# Patient Record
Sex: FEMALE
[De-identification: names, ages, dates, MRNs, and addresses within clinical notes are randomized per-mention and may not be internally consistent; named-entity substitution may affect disease eponyms.]

---

## 2022-07-12 LAB
HCT VFR BLD CALC: 30.9 % (ref 30.3–42.9)
HGB BLD-MCNC: 10.3 GM/DL (ref 10.1–14.3)
MCHC RBC AUTO-ENTMCNC: 33 % (ref 30–34)
MCV RBC AUTO: 88 FL (ref 79–97)
PLATELET # BLD: 250 K/MM3 (ref 140–440)
RBC # BLD AUTO: 3.51 M/MM3 (ref 3.65–5.03)

## 2022-07-14 ENCOUNTER — HOSPITAL ENCOUNTER (INPATIENT)
Dept: HOSPITAL 5 - APU | Age: 32
LOS: 2 days | Discharge: HOME | End: 2022-07-16
Attending: STUDENT IN AN ORGANIZED HEALTH CARE EDUCATION/TRAINING PROGRAM | Admitting: STUDENT IN AN ORGANIZED HEALTH CARE EDUCATION/TRAINING PROGRAM
Payer: COMMERCIAL

## 2022-07-14 DIAGNOSIS — O34.211: Primary | ICD-10-CM

## 2022-07-14 DIAGNOSIS — Z3A.39: ICD-10-CM

## 2022-07-14 DIAGNOSIS — Z20.822: ICD-10-CM

## 2022-07-14 DIAGNOSIS — Z30.2: ICD-10-CM

## 2022-07-14 DIAGNOSIS — D62: ICD-10-CM

## 2022-07-14 LAB
BASOPHILS # (AUTO): 0 K/MM3 (ref 0–0.1)
BASOPHILS NFR BLD AUTO: 0.3 % (ref 0–1.8)
EOSINOPHIL # BLD AUTO: 0.3 K/MM3 (ref 0–0.4)
EOSINOPHIL NFR BLD AUTO: 2.7 % (ref 0–4.3)
HCT VFR BLD CALC: 29 % (ref 30.3–42.9)
HCT VFR BLD CALC: 32.7 % (ref 30.3–42.9)
HGB BLD-MCNC: 10.9 GM/DL (ref 10.1–14.3)
HGB BLD-MCNC: 9.6 GM/DL (ref 10.1–14.3)
LYMPHOCYTES # BLD AUTO: 2.7 K/MM3 (ref 1.2–5.4)
LYMPHOCYTES NFR BLD AUTO: 27.5 % (ref 13.4–35)
MCHC RBC AUTO-ENTMCNC: 33 % (ref 30–34)
MCV RBC AUTO: 88 FL (ref 79–97)
MONOCYTES # (AUTO): 0.8 K/MM3 (ref 0–0.8)
MONOCYTES % (AUTO): 7.7 % (ref 0–7.3)
PLATELET # BLD: 258 K/MM3 (ref 140–440)
RBC # BLD AUTO: 3.71 M/MM3 (ref 3.65–5.03)

## 2022-07-14 PROCEDURE — 0UB70ZZ EXCISION OF BILATERAL FALLOPIAN TUBES, OPEN APPROACH: ICD-10-PCS | Performed by: STUDENT IN AN ORGANIZED HEALTH CARE EDUCATION/TRAINING PROGRAM

## 2022-07-14 PROCEDURE — 86850 RBC ANTIBODY SCREEN: CPT

## 2022-07-14 PROCEDURE — 86901 BLOOD TYPING SEROLOGIC RH(D): CPT

## 2022-07-14 PROCEDURE — 86900 BLOOD TYPING SEROLOGIC ABO: CPT

## 2022-07-14 PROCEDURE — 85014 HEMATOCRIT: CPT

## 2022-07-14 PROCEDURE — 85027 COMPLETE CBC AUTOMATED: CPT

## 2022-07-14 PROCEDURE — U0003 INFECTIOUS AGENT DETECTION BY NUCLEIC ACID (DNA OR RNA); SEVERE ACUTE RESPIRATORY SYNDROME CORONAVIRUS 2 (SARS-COV-2) (CORONAVIRUS DISEASE [COVID-19]), AMPLIFIED PROBE TECHNIQUE, MAKING USE OF HIGH THROUGHPUT TECHNOLOGIES AS DESCRIBED BY CMS-2020-01-R: HCPCS

## 2022-07-14 PROCEDURE — 85018 HEMOGLOBIN: CPT

## 2022-07-14 PROCEDURE — 88302 TISSUE EXAM BY PATHOLOGIST: CPT

## 2022-07-14 PROCEDURE — 86592 SYPHILIS TEST NON-TREP QUAL: CPT

## 2022-07-14 PROCEDURE — 96361 HYDRATE IV INFUSION ADD-ON: CPT

## 2022-07-14 PROCEDURE — 96360 HYDRATION IV INFUSION INIT: CPT

## 2022-07-14 PROCEDURE — 96365 THER/PROPH/DIAG IV INF INIT: CPT

## 2022-07-14 PROCEDURE — 85025 COMPLETE CBC W/AUTO DIFF WBC: CPT

## 2022-07-14 PROCEDURE — 36415 COLL VENOUS BLD VENIPUNCTURE: CPT

## 2022-07-14 PROCEDURE — 96366 THER/PROPH/DIAG IV INF ADDON: CPT

## 2022-07-14 RX ADMIN — KETOROLAC TROMETHAMINE SCH MG: 30 INJECTION, SOLUTION INTRAMUSCULAR at 15:57

## 2022-07-14 RX ADMIN — OXYCODONE AND ACETAMINOPHEN PRN TAB: 5; 325 TABLET ORAL at 13:15

## 2022-07-14 RX ADMIN — MORPHINE SULFATE PRN MG: 4 INJECTION, SOLUTION INTRAMUSCULAR; INTRAVENOUS at 23:16

## 2022-07-14 RX ADMIN — MORPHINE SULFATE PRN MG: 4 INJECTION, SOLUTION INTRAMUSCULAR; INTRAVENOUS at 17:07

## 2022-07-14 RX ADMIN — KETOROLAC TROMETHAMINE SCH MG: 30 INJECTION, SOLUTION INTRAMUSCULAR at 20:56

## 2022-07-14 NOTE — ANESTHESIA CONSULTATION
Anesthesia Consult and Med Hx


Date of service: 07/14/22





- Airway


Anesthetic Teeth Evaluation: Good


ROM Head & Neck: Adequate


Mental/Hyoid Distance: Adequate


Mallampati Class: Class II


Intubation Access Assessment: Good





- Pulmonary Exam


CTA: Yes





- Cardiac Exam


Cardiac Exam: RRR





- Pre-Operative Health Status


ASA Pre-Surgery Classification: ASA2


Proposed Anesthetic Plan: Spinal (h/o anxiety)





- Pulmonary


Hx Smoking: Yes


Hx Asthma: No





- Cardiovascular System


Hx Hypertension: No





- Central Nervous System


Hx Seizures: No


Hx Psychiatric Problems: No





- Endocrine


Hx Renal Disease: No


Hx Hypothyroidism: No


Hx Hyperthyroidism: No





- Hematic


Hx Anemia: No


Hx Sickle Cell Disease: No





- Other Systems


Hx Alcohol Use: No


Hx Cancer: No

## 2022-07-14 NOTE — OPERATIVE REPORT
Operative Report


Operative Report: 





Date of Procedure: 2022





Preoperative diagnosis: IUP @39 weeks 4 days, history of , desire for 

permanent sterilization    


 


Postoperative diagnosis: same, s/p repeat  and tubal ligation


 


Procedure: Low transverse  section with vacuum assistance and bilateral 

salpingectomy





Surgeon: Jennifer Hyde MD





Assistant: BAKARI





Anesthesia: Spinal


 


Complications: none 


 





EBL: 486 ml  





UOP: 50 ml, clear urine at the end of procedure  





Indications: Repeat  section





Findings: 3300 g male infant in KEYLA position cephalic presentation with Apgars 7

& 9





Amniotic fluid clear





Fallopian tubes normal in appearance bilaterally





Ovaries normal in appearance bilaterally





Procedure: The patient was taken to the operating room where epidural anesthesia

was found to be adequate. 2 g Ancef was given prior to the procedure. She was 

then prepared and draped in the usual sterile fashion in the dorsal supine 

position with a leftward tilt. A Pfannenstiel skin incision was made with the 

scalpel and carried through to the underlying layer of fascia with the bovie. 

The fascia was incised in the midline and the incision extended laterally. The 

superior aspect of the fascial incision was then grasped with the Kocher's 

clamps, elevated, and the underlying rectus muscles dissected off bluntly and 

with sharp dissection using bovie. Attention was then turned to the inferior 

aspect of this incision which, in a similar fashion, was grasped, tented up with

the Kocher clamps, and the rectus was dissected off bluntly and with sharp 

dissection. The rectus muscles were then  in the midline, and the 

peritoneum identified and entered bluntly. The peritoneal incision was then e

xtended superiorly and inferiorly with good visualization of the bladder.  

Jaskaran retractor was placed.  The bladder blade was then inserted and the 

vesicouterine peritoneum identified, grasped with the pick ups, and entered 

sharply with the Metzenbaum scissors. This incision was then extended laterally 

and the bladder flap created digitally. The bladder blade was then reinserted 

and the lower uterine segment incised in a transverse fashion with the scalpel. 

The uterine incision was then extended laterally digitally. The bladder blade 

was then removed and the infant was delivered via KEYLA position after application

of the Kiwi vacuum system.  No pop offs.  Tight nuchal cord reduced. The nose 

and mouth were suctioned with the bulb suction and the cord clamped and cut. The

infant was handed off to the waiting pediatricians.The placenta was then 

removed; the uterus exteriorized and cleared of all clots and debris. The 

uterine incision was repaired with 0 vicryl in a running locked fashion to 

obtain excellent hemostasis. An imbrication layer was done.  The right tube was 

grasped with Lenin clamps.  An Allis clamp was placed underneath the fallopian

tube and the mesosalpinx was ligated using the Bovie cautery.  Cautery applied 

to the tubal stump and along the remaining mesosalpinx for hemostasis.  The left

tube was treated in a similar fashion.  An area of oozing along the remaining 

mesosalpinx was noted on the right side and sutured using 2-0 Vicryl.  

Hemostasis was noted.  Uterus returned to the abdomen. The rectus muscle was 

reapproximated with 2-0 vicryl. The fascia was reapproximated with 0 vicryl in a

running fashion. Subcutaneous layer reapproximated with interrupted sutures of 

2-0 vicryl in 2 layers. The skin was closed with 4-0 monocryl subcuticular 

stitch and the incision sealed with Steri-strips.The patient tolerated the 

procedure well. Sponge, lap, needle counts correct  X 2.  The patient was taken 

to the recovery room in a stable condition.

## 2022-07-15 RX ADMIN — MORPHINE SULFATE PRN MG: 4 INJECTION, SOLUTION INTRAMUSCULAR; INTRAVENOUS at 06:27

## 2022-07-15 RX ADMIN — OXYCODONE AND ACETAMINOPHEN PRN TAB: 5; 325 TABLET ORAL at 14:16

## 2022-07-15 RX ADMIN — IBUPROFEN SCH MG: 800 TABLET, FILM COATED ORAL at 09:59

## 2022-07-15 RX ADMIN — OXYCODONE AND ACETAMINOPHEN PRN TAB: 5; 325 TABLET ORAL at 22:34

## 2022-07-15 RX ADMIN — OXYCODONE AND ACETAMINOPHEN PRN TAB: 5; 325 TABLET ORAL at 08:12

## 2022-07-15 RX ADMIN — IBUPROFEN SCH MG: 800 TABLET, FILM COATED ORAL at 17:37

## 2022-07-15 RX ADMIN — OXYCODONE AND ACETAMINOPHEN PRN TAB: 5; 325 TABLET ORAL at 02:47

## 2022-07-15 NOTE — PROGRESS NOTE
Assessment and Plan


 VSSAF, postop H&H 9.6/29.7 - asymptomatic for anemia d/t acute blood loss. 

incision dressed - D&I. lochia scant, fundus firm. 








- Patient Problems


(1)  delivery delivered


Current Visit: Yes   Status: Acute   


Plan to address problem: 


Continue postop pathway


Advance diet and activity as tolerated








(2) Sterilization


Current Visit: Yes   Status: Acute   





Subjective





- Subjective


Date of service: 07/15/22


Principal diagnosis: Postop day #1


Patient reports: appetite normal, voiding normally, pain well controlled, 

flatus, ambulating normally, no dizzy ambulation, no nauseated


: doing well, bottle feeding





Objective





- Vital Signs


Latest vital signs: 


                                   Vital Signs











  Temp Pulse Resp BP BP Pulse Ox Pulse Ox


 


 07/15/22 07:21  98.5 F  72  20  145/86   98 


 


 07/15/22 06:27    18    


 


 07/15/22 05:00  97.8 F  73  18  128/71   97 


 


 07/15/22 02:47    18    


 


 07/15/22 00:43  98.2 F  80  20  143/68   97 


 


 22 23:16    18    


 


 22 21:20  98.5 F  55 L  18  133/69   97 


 


 22 20:56    18    


 


 22 17:43        100


 


 22 16:26  98.5 F  64  18   136/69  97 


 


 22 13:30  97.4 F L      


 


 22 12:15    18  117/69    100


 


 22 11:30  97.6 F  60  17  120/57   99 


 


 22 11:15   64  12  101/47   98 


 


 22 11:00  97.6 F  60  19  108/51   98 


 


 22 10:45   67  16  107/49   97 


 


 22 10:40   59 L  13  100/49   98 


 


 22 10:35   64  16  100/58   98 


 


 22 10:31  94.5 F L  64  15  114/63   98 








                                Intake and Output











 07/14/22 07/15/22 07/15/22





 23:59 07:59 15:59


 


Intake Total 940 120 


 


Output Total 1900 600 


 


Balance -960 -480 


 


Intake:   


 


    


 


    ceFAZolin 2 GM In NaCl 0. 100  





    9% 100 ml @ 200 mls/hr IV   





    Q8H [facility] Rx#:[id number]   


 


  Oral 360  


 


  Intake, Free Water 480 120 


 


Output:   


 


  Urine 1900 600 


 


    Indwelling Catheter 1400  


 


    Void 500 600 


 


Other:   


 


  Total, Intake Amount 360  


 


  Total, Output Amount 500 600 


 


  # Voids   


 


    Void  1 














- Exam


Cardiovascular: Present: Regular rate


Lungs: Present: Normal air movement


Abdomen: Present: normal appearance, soft


Vulva: both: normal


Uterus: Present: normal, firm, fundal height below umbilicus


Deep Tendon Reflex Grade: Normal +2


Incision: Present: normal, dry, dressed





- Labs


Labs: 


                              Abnormal lab results











  22 Range/Units





  23:28 


 


Hgb  9.6 L  (10.1-14.3)  gm/dl


 


Hct  29.0 L  (30.3-42.9)  %

## 2022-07-16 VITALS — DIASTOLIC BLOOD PRESSURE: 73 MMHG | SYSTOLIC BLOOD PRESSURE: 137 MMHG

## 2022-07-16 RX ADMIN — OXYCODONE AND ACETAMINOPHEN PRN TAB: 5; 325 TABLET ORAL at 09:01

## 2022-07-16 RX ADMIN — IBUPROFEN SCH MG: 800 TABLET, FILM COATED ORAL at 04:01

## 2022-07-16 NOTE — POST ANESTHESIA EVALUATION
- Post Anesthesia Evaluation


Other Comments: Pt discharged, OBGYN note reviewed, no problems noted.

## 2022-07-16 NOTE — DISCHARGE SUMMARY
Providers





- Providers


Date of Admission: 


22 05:44





Date of discharge: 22


Attending physician: 


MAME HONEYCUTT MD





none


Primary care physician: 


MAME HONEYCUTT MD








Hospitalization


Reason for admission: repeat  section


Condition: Good


Hospital course: 





Patient presented at 39w for repeat  section and tubal ligation. See 

operative note for additional details. Patient recovered well postoperatively 

and baby at bedside doing well. Appropriate postop hemoglobin of 9.6. Blood 

pressures within normal limits. Pain well controlled. Stable for discharge on 

POD #2.


Disposition: 01 HOME / SELF CARE / HOMELESS


Final Discharge Diagnosis (Prints w/discharge instructions): s/p  

delivery





- Discharge Diagnoses


(1) 39 weeks gestation of pregnancy


Status: Acute   





(2) History of  delivery


Status: Acute   





(3) Sterilization


Status: Acute   





Core Measure Documentation





- Palliative Care


Palliative Care/ Comfort Measures: Not Applicable





- Core Measures


Any of the following diagnoses?: none





Exam





- Constitutional


Vitals: 


                                        











Temp Pulse Resp BP Pulse Ox


 


 98.0 F   73   16   137/73   99 


 


 22 07:52  22 07:52  22 07:52  22 07:52  22 07:52











General appearance: Present: no acute distress, well-nourished





- Respiratory


Respiratory effort: normal





- Extremities


Extremities: pulses symmetrical, No edema





- Abdominal


General gastrointestinal: Present: soft, non-tender, non-distended


Female genitourinary: Present: normal





- Integumentary


Integumentary: Present: clear, warm, dry





- Psychiatric


Psychiatric: appropriate mood/affect, intact judgment & insight





- Additional findings


Additional findings: 





Incision C/D/I with Steristrips





Plan


Activity: advance as tolerated, other


Weight Bearing Status: Full Weight Bearing


Diet: regular


Wound: keep clean and dry


Follow up with: 


MAME HONEYCUTT MD [Primary Care Provider] - 7 Days


Prescriptions: 


Docusate Sodium [Colace] 100 mg PO BID #60 capsule


Lidocain2.5%/Prilocai2.5% [Emla] 1 applic TP ONCE #1 tube


Ferrous Sulfate [Feosol 325 MG tab] 325 mg PO QDAY #30 tablet


Ibuprofen [Motrin 800 MG tab] 800 mg PO Q8HR #30 tablet


oxyCODONE /ACETAMINOPHEN [Percocet 5/325] 1 tab PO Q6HR PRN #20 tablet


 PRN Reason: Pain


Prenatal Vit-Fe Fumar-FA [Prenatal Vitamin] 1 tab PO QDAY #30 tablet

## 2025-05-20 NOTE — HISTORY AND PHYSICAL REPORT
----- Message from Dr. New Au MD sent at 5/20/2025  9:20 AM EDT -----  Echo looks about the same as 2023, very slight decrease in ejection fraction but still around 50% which is low-normal  ----- Message -----  From: New Au MD  Sent: 5/19/2025   4:07 PM EDT  To: New Au MD   History of Present Illness


Date of examination: 22


Date of admission: 


2022


Chief complaint: 





 section


History of present illness: 





Patient is a  for repeat  section and bilateral salpingectomy. 

Voices no complaints. +FM. Denies contractions, leakage of fluid, and vaginal 

bleeding. 





Past History


Past Medical History: no pertinent history


Past Surgical History:  section


Family/Genetic History: hypertension, cancer


Social history: no significant social history





- Obstetrical History


: 6


Para: 1


Hx # Term Pregnancies: 1


Number of  Pregnancies: 9


Spontaneous Abortions: 4


Induced : 0


Number of Living Children: 1





Medications and Allergies


                                    Allergies











Allergy/AdvReac Type Severity Reaction Status Date / Time


 


No Known Allergies Allergy   Unverified 22 16:55











                                Home Medications











 Medication  Instructions  Recorded  Confirmed  Last Taken  Type


 


ALPRAZolam [Xanax TAB] 0.5 mg PO BID PRN 22 Unknown History














- Vital Signs


Vital signs: 


                                   Vital Signs











Temp Pulse Resp BP Pulse Ox


 


 99 F   92 H  16   136/73   98 


 


 22 11:20  22 11:20  22 11:20  22 11:20  22 11:20








                                        











Temp Pulse Resp BP Pulse Ox


 


 99 F   92 H  16   136/73   98 


 


 22 11:20  22 11:20  22 11:20  22 11:20  22 11:20














- Physical Exam


Abdomen: Positive: normal appearance, soft, normal bowel sounds.  Negative: 

distention, tenderness


Extremities: 





- Obstetrical


FHR: category 1


Uterine Contraction Monitor Mode: External


Uterine Contraction Pattern: Absent





Results


Result Diagrams: 


                                 22 06:25





All other labs normal.








Assessment and Plan





Patient for repeat  section and bilateral salpingectomy


Risks of procedure reviewed including pain, bleeding, infection, damage to 

surrounding tissues and structures, need for further procedures. 


Consents signed


Ancef 2 g preop antibiotics


Type and screen, CBC


TO OR as scheduled 





- Patient Problems


(1) 39 weeks gestation of pregnancy


Current Visit: Yes   Status: Acute   





(2) History of  delivery


Current Visit: Yes   Status: Acute   





(3) Sterilization


Current Visit: Yes   Status: Acute